# Patient Record
(demographics unavailable — no encounter records)

---

## 2024-12-04 NOTE — PHYSICAL EXAM
[Soft] : soft [Non-tender] : non-tender [Normal] : normal [Uterine Adnexae] : normal [Discharge] : a  ~M vaginal discharge was present [White] : white [Frothy] : frothy [FreeTextEntry6] : no masses, non tender, no dicharge, no lymphadenopathy

## 2024-12-04 NOTE — PHYSICAL EXAM
Referred by: Dagoberto Stroud MD; Medical Diagnosis (from order):    Diagnosis Information      Diagnosis    726.2 (ICD-9-CM) - M75.41 (ICD-10-CM) - Impingement syndrome of right shoulder                Physical Therapy -  Daily Treatment Note    Visit:  6     SUBJECTIVE                                                                                                             Patient reports mild soreness in the right shoulder after last session.  No pain currently.  His right shoulder usually feels tired by the end of his shift at work.      Pain / Symptoms:  Pain rating (out of 10): Current: 0   Location: Right shoulder     OBJECTIVE                                                                                                                        TREATMENT                                                                                                                  Therapeutic Exercise:  UBE level 4 x 6' forward/retro  Doorway stretch- mid, high level 20 sec x 2 each  Rows 45#  3 x 10  IR 10# with towel 2 x 10  (ER 5#  With towel 2 x 10- )  Ext 12.5# 3 x 10  Hor abd 5# 2 x 10  B tricep push downs 40# 3 x 10  Lat pull downs in front 60# 2 x 10  Manual below  (PROM to right shoulder- flexion, abd, IR, ER at 70 deg abd)  3# dumbbells-B-  flexion, scaption to 90 deg 2 x 10  (Prone scap rows, ext 1 x 10)  (Supine serratus punches 4# 2 x 10)  (Rhythmic stab at 90 deg flexion- 45 seconds x 2)  Seated ER with shoulder abducted at 70 degrees 2 x 10         Manual Therapy:  (Supine GH joint mobs- grade 2, 3 posterior R)  STM to pec minor, biceps tendon in sitting    Skilled input: as detailed above    Writer verbally educated and received verbal consent for hand placement, positioning of patient, and techniques to be performed today from patient for hand placement and palpation for techniques as described above and how they are pertinent to the patient's plan of care.    Home Exercise Program: Doorway retraction, wall  [Soft] : soft flexion, ER/abd in supine, ER AA with cane, posterior capsule stretch and scap pinches     12/18- thoracic extension on towel roll   TB- R H abd, flexion, abd and B rows. Issued red, green, blue and black TBs with thera loop     ASSESSMENT                                                                                                             Patient is positioning the scapula in a more retracted and depressed position while performing exercises which is alleviating impingement in the anterior shoulder.  Continued postural education will help reduce stresses in the shoulder when performing daily activities and work responsibilities.  Treatment duration shortened this date because patient had to get to school for final exams.    Pain/symptoms after session: 0        PLAN                                                                                                                           Suggestions for next session as indicated: Progress per plan of care, RC/deltoid strengthening, postural strengthening and education, thoracic extension, GH joint mobilization and stretching, HEP.            Procedures and total treatment time documented Time Entry flowsheet.   [Non-tender] : non-tender [Normal] : normal [Uterine Adnexae] : normal [Discharge] : a  ~M vaginal discharge was present [White] : white [Frothy] : frothy [FreeTextEntry6] : no masses, non tender, no dicharge, no lymphadenopathy

## 2024-12-04 NOTE — PLAN
[FreeTextEntry1] : BV cultures and STD testing performed Recommend condom use if enters into a new relationship. Advised to call if desires anything for BC.

## 2024-12-04 NOTE — END OF VISIT
[TextEntry] : IScarlet, am scribing for and the presence of Dr. Feli Wilks the following sections HISTORY OF PRESENT ILLNESS, PAST MEDICAL/FAMILY/SOCIAL HISTORY; REVIEW OF SYSTEMS; PHYSICAL EXAM; DISPOSITION.

## 2025-01-28 NOTE — PLAN
[FreeTextEntry1] : Chlamydia culture completed. Advised pt. to tell last partner about chlamydia to prevent further spread.

## 2025-01-28 NOTE — PHYSICAL EXAM
[Discharge] : a  ~M vaginal discharge was present [Frothy] : frothy [Normal] : normal [Scant] : scant

## 2025-03-05 NOTE — DISCUSSION/SUMMARY
[FreeTextEntry1] : 21 yo F with ADHD-I and test anxiety; Protective factors of supportive family and friends, looks to treatment favorably, as such with treatment adherence, prognosis is good.  [Low acute suicide risk] : Low acute suicide risk [No] : No [Not clinically indicated] : Safety Plan completed/updated (for individuals at risk): Not clinically indicated

## 2025-03-05 NOTE — SOCIAL HISTORY
[FreeTextEntry1] : Lives with mom and brother (3 years younger); mom Nursing assistant   grew up in [facility]; graduated high school 2023;  [place], then transfer to [employer] practical nursing school   works as a para in grade school.

## 2025-03-05 NOTE — PLAN
[FreeTextEntry4] : PLAN: -psychoeducation about diagnosis and treatment modalities, alternatives to recommended treatment, risk Vs benefits of treatment and no treatment and alternative treatments. - receommend psychotherapy EF skills for ADHD  -Lab/other tests: appreciate coordination of care with PMD, TSHscreen, sleep eval , consider airway dentist for sleep eval/appliance  -Medication: MPH 18mg qam only as needed study/work days.    -Safety Emergency procedures were discussed  -resource: [employer]  -Patient given opportunity to ask questions and their questions were answered and they expressed understanding and agreement with above plan. -Follow up: RTC in 4 weeks for medication management or earlier as needed      I spent a total of 60 minutes for today's visit in evaluating and treating the patient as per above, including: preparing for patient's appointment (review of prior documents); obtaining and reviewing separately obtained history; performing a medically necessary exam/evaluation; independently interpreting results (labs/Health and Behavior Assessments); communicating/counseling/educating the patient ; ordering medications; referring to  other health care professionals; documenting clinical information in the EHR

## 2025-03-05 NOTE — REASON FOR VISIT
[Self-Referred] : Self-Referred [Patient] : Patient [FreeTextEntry1] : ADHD evaluation , school anxiety

## 2025-03-05 NOTE — HISTORY OF PRESENT ILLNESS
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   Pt explains she's had a lifelong hx of difficulty with attention; States grew up in single parent home and mother was not fluent in english, had difficulty navigating education system; Pt recalls since elementary school teachers often bringing up concerns of her poor attention and fidgeting/asking to leave the classroom for movemement breaks; Pt does not recall any formal IEP or 504 in place as mother never took her for an evaluation, however informally teachers were able to  provide small group pull out sessions to help her pay enough attention to pass the classes; Pt graduated high school in 2023, then attended [employer] for a semester, struggled academically and withdrew; Then attended current nursing assistant program, however "flunked out"; In most recent discussion with her  was explained she meets criteria for ADHD and test anxiety and should seek evaluation and treatment prior to starting next semester which starts in April; 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  Inattention Sx: Patient reports making a lot of careless mistakes, difficulty sustaining attention on tasks or play activities, was often told by parent/teachers she doesn't listen, difficulty following instructions, difficulty organizing tasks, dislikes/avoids tasks requiring attention, loses things, easily distracted, forgetful in daily activities   Hyperactivity Sx: Figets, difficulty remaining seated, talks excessively, difficulty waiting turn, often interrupts or intrudes. These Sx have been present for age 12 and for at least 6 months, and several of the symptoms are present in 2 or more settings, clinically significant impairment    Patient does not describe themselves as a nervous person, except when it comes to tests/exams with  excessive worrying about her performance or if she will pass, struggles with attention to the question on the test. Patient denies feeling restless or on edge generally unless in testing situations.  They do not find themselves more irritable. They deny any muscle tension but do have sleep disturbances. Patient has not experienced symptoms of panic attack.  Pt denies MDD/marija/hypomania sx They deny any symptoms of PTSD.  Patient denies any obsessive thoughts or compulsive behaviors.  Patient denies any perceptual disturbances. No delusions elicited or endorsed during exam.   [FreeTextEntry2] : as per hpi  [FreeTextEntry3] : none

## 2025-03-05 NOTE — RISK ASSESSMENT
[Clinical Records] : Clinical Records [Clinical Interview] : Clinical Interview [No] : No [Ability to cope with stress] : ability to cope with stress [Positive therapeutic relationships] : positive therapeutic relationships [Responsibility to children, family, or others] : responsibility to children, family, or others [Engaged in work or school] : engaged in work or school

## 2025-03-05 NOTE — PHYSICAL EXAM
[Cooperative] : cooperative [Euthymic] : euthymic [Anxious] : anxious [Full] : full [Clear] : clear [Linear/Goal Directed] : linear/goal directed [None] : none [Preoccupations/Ruminations] : preoccupations/ruminations [None Reported] : none reported [Attention/Concentration] : attention/concentration [Average] : average [WNL] : within normal limits [FreeTextEntry7] : school anxiety , test anxiety

## 2025-04-02 NOTE — PHYSICAL EXAM
[Cooperative] : cooperative [Euthymic] : euthymic [Full] : full [Clear] : clear [Linear/Goal Directed] : linear/goal directed [None] : none [Preoccupations/Ruminations] : preoccupations/ruminations [None Reported] : none reported [Attention/Concentration] : attention/concentration [Average] : average [WNL] : within normal limits [FreeTextEntry7] : school anxiety , test anxiety

## 2025-04-02 NOTE — DISCUSSION/SUMMARY
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 March 2025: MPH 18mg effective

## 2025-04-02 NOTE — PLAN
[No] : No [Medication education provided] : Medication education provided. [Rationale for medication choices, possible risks/precautions, benefits, alternative treatment choices, and consequences of non-treatment discussed] : Rationale for medication choices, possible risks/precautions, benefits, alternative treatment choices, and consequences of non-treatment discussed with patient/family/caregiver  [FreeTextEntry5] : -psychoeducation about diagnosis and treatment modalities  - recommend psychotherapy EF skills for ADHD -diagnostic letter for exam accomodation  -Lab/other tests: appreciate coordination of care with PMD, TSHscreen, sleep eval , consider airway dentist for sleep eval/appliance -Medication: MPH 18mg qam only as needed study/work days. -Safety Emergency procedures were discussed -resource: [employer] -Patient given opportunity to ask questions and their questions were answered and they expressed understanding and agreement with above plan. -Follow up: RTC in 4 weeks for medication management or earlier as needed      Psychotherapy documentation: Time spent for Psychotherapy: 16 minutes Modality: Supportive psychotherapy and psychoeducation pt and parent Goal: Reduction in symptoms of adhd and anxiety. Focus is session: 1. Discussed daily activities that help elevate  mood and the behaviors and symptoms that interfere with  ability to maintain a daily routine and structure ; EF skills for time management. 2. Psychoeducation about medications, risk vs benefits of medications, role of behavior activation and coping skills to help in improving  mood and anxiety symptoms and daily functioning.

## 2025-04-02 NOTE — SOCIAL HISTORY
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

## 2025-04-02 NOTE — HISTORY OF PRESENT ILLNESS
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   Pt explains she's had a lifelong hx of difficulty with attention; States grew up in single parent home and mother was not fluent in english, had difficulty navigating education system; Pt recalls since elementary school teachers often bringing up concerns of her poor attention and fidgeting/asking to leave the classroom for movemement breaks; Pt does not recall any formal IEP or 504 in place as mother never took her for an evaluation, however informally teachers were able to  provide small group pull out sessions to help her pay enough attention to pass the classes; Pt graduated high school in 2023, then attended [employer] for a semester, struggled academically and withdrew; Then attended current nursing assistant program, however "flunked out"; In most recent discussion with her  was explained she meets criteria for ADHD and test anxiety and should seek evaluation and treatment prior to starting next semester which starts in April; 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  Inattention Sx: Patient reports making a lot of careless mistakes, difficulty sustaining attention on tasks or play activities, was often told by parent/teachers she doesn't listen, difficulty following instructions, difficulty organizing tasks, dislikes/avoids tasks requiring attention, loses things, easily distracted, forgetful in daily activities   Hyperactivity Sx: Figets, difficulty remaining seated, talks excessively, difficulty waiting turn, often interrupts or intrudes. These Sx have been present for age 12 and for at least 6 months, and several of the symptoms are present in 2 or more settings, clinically significant impairment    Patient does not describe themselves as a nervous person, except when it comes to tests/exams with  excessive worrying about her performance or if she will pass, struggles with attention to the question on the test. Patient denies feeling restless or on edge generally unless in testing situations.  They do not find themselves more irritable. They deny any muscle tension but do have sleep disturbances. Patient has not experienced symptoms of panic attack.  Pt denies MDD/marija/hypomania sx They deny any symptoms of PTSD.  Patient denies any obsessive thoughts or compulsive behaviors.  Patient denies any perceptual disturbances. No delusions elicited or endorsed during exam.   [FreeTextEntry2] : as per hpi  [FreeTextEntry3] : none

## 2025-04-02 NOTE — REASON FOR VISIT
[Patient] : Patient [FreeTextEntry1] : adhd , anxiety followup  [TextEntry] : This visit was provided via telehealth using real-time 2-way audio visual technology HIPPA compliant [facility].  The patient, LENARD CAMP , was located at [address] [place] [address] at the time of the visit. The provider, LUZ MENA, was located at the medical office located in [place] at the time of the visit.  The patient, LENARD CAMP and Provider participated in the telehealth encounter.   Verbal consent given on Apr 02, 2025   by the   patient LENARD CAMP   .

## 2025-07-21 NOTE — PLAN
[No] : No [Medication education provided] : Medication education provided. [Rationale for medication choices, possible risks/precautions, benefits, alternative treatment choices, and consequences of non-treatment discussed] : Rationale for medication choices, possible risks/precautions, benefits, alternative treatment choices, and consequences of non-treatment discussed with patient/family/caregiver  [FreeTextEntry5] :  -psychoeducation about diagnosis and treatment modalities - recommend psychotherapy EF skills for ADHD -diagnostic letter for school/exam accommodation -Lab/other tests: appreciate coordination of care with PMD, TSHscreen, sleep eval , consider airway dentist for sleep eval/appliance -Medication: MPH 18mg qam only as needed study/work days. -Safety Emergency procedures were discussed -resource: [employer] -Patient given opportunity to ask questions and their questions were answered and they expressed understanding and agreement with above plan. -Follow up: RTC in 8-12 weeks for medication management or earlier as needed       Psychotherapy documentation: Time spent for Psychotherapy: 16 minutes Modality: Supportive psychotherapy and psychoeducation pt and parent Goal: Reduction in symptoms of adhd and anxiety. Focus is session: 1. Discussed daily activities that help elevate mood and the behaviors and symptoms that interfere with ability to maintain a daily routine and structure ; EF skills for time management, study skills. 2. Psychoeducation about medications, risk vs benefits of medications, role of behavior activation and coping skills to help in improving mood and anxiety symptoms and daily functioning.

## 2025-07-21 NOTE — DISCUSSION/SUMMARY
[FreeTextEntry1] :  22 yo F with ADHD-I and test anxiety; Protective factors of supportive family and friends, looks to treatment favorably, as such with treatment adherence, prognosis is good. March 2025: MPH 18mg effective

## 2025-07-21 NOTE — HISTORY OF PRESENT ILLNESS
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   Pt explains she's had a lifelong hx of difficulty with attention; States grew up in single parent home and mother was not fluent in english, had difficulty navigating education system; Pt recalls since elementary school teachers often bringing up concerns of her poor attention and fidgeting/asking to leave the classroom for movemement breaks; Pt does not recall any formal IEP or 504 in place as mother never took her for an evaluation, however informally teachers were able to  provide small group pull out sessions to help her pay enough attention to pass the classes; Pt graduated high school in 2023, then attended [employer] for a semester, struggled academically and withdrew; Then attended current nursing assistant program, however "flunked out"; In most recent discussion with her  was explained she meets criteria for ADHD and test anxiety and should seek evaluation and treatment prior to starting next semester which starts in April; 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  Inattention Sx: Patient reports making a lot of careless mistakes, difficulty sustaining attention on tasks or play activities, was often told by parent/teachers she doesn't listen, difficulty following instructions, difficulty organizing tasks, dislikes/avoids tasks requiring attention, loses things, easily distracted, forgetful in daily activities   Hyperactivity Sx: Figets, difficulty remaining seated, talks excessively, difficulty waiting turn, often interrupts or intrudes. These Sx have been present for age 12 and for at least 6 months, and several of the symptoms are present in 2 or more settings, clinically significant impairment    Patient does not describe themselves as a nervous person, except when it comes to tests/exams with  excessive worrying about her performance or if she will pass, struggles with attention to the question on the test. Patient denies feeling restless or on edge generally unless in testing situations.  They do not find themselves more irritable. They deny any muscle tension but do have sleep disturbances. Patient has not experienced symptoms of panic attack.  Pt denies MDD/marija/hypomania sx They deny any symptoms of PTSD.  Patient denies any obsessive thoughts or compulsive behaviors.  Patient denies any perceptual disturbances. No delusions elicited or endorsed during exam.   [FreeTextEntry2] : as per hpi  [FreeTextEntry3] : none  Vaginal Delivery

## 2025-07-21 NOTE — SOCIAL HISTORY
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    
No pertinent past medical history

## 2025-07-21 NOTE — REASON FOR VISIT
[Patient] : Patient [FreeTextEntry1] : adhd, anxiety followup  [TextEntry] : This visit was provided via telehealth using real-time 2-way audio visual technology HIPPA compliant [facility],Patient's identity was verified. The patient, LENARD CAMP , was located at home, [address] [place] [address] , at the time of the visit. The provider, LUZ MENA, was located at the medical office located in [place] at the time of the visit. The patient, LENARD CAMP and Provider participated in the telehealth encounter. Verbal consent given on Jul 21, 2025   by the  patient LENARD CAMP   .